# Patient Record
Sex: FEMALE | URBAN - METROPOLITAN AREA
[De-identification: names, ages, dates, MRNs, and addresses within clinical notes are randomized per-mention and may not be internally consistent; named-entity substitution may affect disease eponyms.]

---

## 2017-01-05 NOTE — TELEPHONE ENCOUNTER
Requested Prescriptions     Pending Prescriptions Disp Refills    ramipril (ALTACE) 10 mg capsule 90 Cap 1     Sig: Take 1 Cap by mouth daily.  naproxen (NAPROSYN) 500 mg tablet 180 Tab 1     Sig: Take 1 Tab by mouth two (2) times daily (with meals).  empagliflozin (JARDIANCE) 10 mg tablet 90 Tab 1     Sig: Take 1 Tab by mouth daily.

## 2017-01-08 RX ORDER — NAPROXEN 500 MG/1
500 TABLET ORAL 2 TIMES DAILY WITH MEALS
Qty: 180 TAB | Refills: 1 | OUTPATIENT
Start: 2017-01-08

## 2017-01-08 RX ORDER — RAMIPRIL 10 MG/1
10 CAPSULE ORAL DAILY
Qty: 90 CAP | Refills: 0 | Status: SHIPPED | OUTPATIENT
Start: 2017-01-08 | End: 2017-03-20 | Stop reason: SDUPTHER

## 2017-01-08 NOTE — TELEPHONE ENCOUNTER
Patient should not be using Naprosyn this is contributing to her elevated blood pressure and also can result in kidney damage

## 2017-05-30 ENCOUNTER — HOSPITAL ENCOUNTER (OUTPATIENT)
Dept: LAB | Age: 42
Discharge: HOME | End: 2017-05-30
Payer: OTHER GOVERNMENT

## 2017-05-30 ENCOUNTER — OFFICE VISIT (OUTPATIENT)
Dept: FAMILY MEDICINE CLINIC | Age: 42
End: 2017-05-30

## 2017-05-30 VITALS
DIASTOLIC BLOOD PRESSURE: 66 MMHG | HEART RATE: 73 BPM | TEMPERATURE: 98.2 F | WEIGHT: 290 LBS | BODY MASS INDEX: 45.52 KG/M2 | RESPIRATION RATE: 16 BRPM | SYSTOLIC BLOOD PRESSURE: 130 MMHG | HEIGHT: 67 IN | OXYGEN SATURATION: 99 %

## 2017-05-30 DIAGNOSIS — R73.9 HYPERGLYCEMIA: ICD-10-CM

## 2017-05-30 DIAGNOSIS — E55.9 VITAMIN D DEFICIENCY: ICD-10-CM

## 2017-05-30 DIAGNOSIS — I10 ESSENTIAL HYPERTENSION: Primary | ICD-10-CM

## 2017-05-30 DIAGNOSIS — E78.5 HYPERLIPIDEMIA, UNSPECIFIED HYPERLIPIDEMIA TYPE: ICD-10-CM

## 2017-05-30 DIAGNOSIS — I10 ESSENTIAL HYPERTENSION: ICD-10-CM

## 2017-05-30 LAB
ALBUMIN SERPL BCP-MCNC: 3.7 G/DL (ref 3.4–5)
ALBUMIN/GLOB SERPL: 1 {RATIO} (ref 0.8–1.7)
ALP SERPL-CCNC: 64 U/L (ref 45–117)
ALT SERPL-CCNC: 30 U/L (ref 13–56)
ANION GAP BLD CALC-SCNC: 11 MMOL/L (ref 3–18)
APPEARANCE UR: CLEAR
AST SERPL W P-5'-P-CCNC: 19 U/L (ref 15–37)
BASOPHILS # BLD AUTO: 0 K/UL (ref 0–0.06)
BASOPHILS # BLD: 0 % (ref 0–2)
BILIRUB SERPL-MCNC: 0.3 MG/DL (ref 0.2–1)
BILIRUB UR QL: NEGATIVE
BUN SERPL-MCNC: 10 MG/DL (ref 7–18)
BUN/CREAT SERPL: 13 (ref 12–20)
CALCIUM SERPL-MCNC: 8.5 MG/DL (ref 8.5–10.1)
CHLORIDE SERPL-SCNC: 101 MMOL/L (ref 100–108)
CHOLEST SERPL-MCNC: 201 MG/DL
CO2 SERPL-SCNC: 27 MMOL/L (ref 21–32)
COLOR UR: YELLOW
CREAT SERPL-MCNC: 0.75 MG/DL (ref 0.6–1.3)
DIFFERENTIAL METHOD BLD: ABNORMAL
EOSINOPHIL # BLD: 0.1 K/UL (ref 0–0.4)
EOSINOPHIL NFR BLD: 2 % (ref 0–5)
ERYTHROCYTE [DISTWIDTH] IN BLOOD BY AUTOMATED COUNT: 21.1 % (ref 11.6–14.5)
EST. AVERAGE GLUCOSE BLD GHB EST-MCNC: 128 MG/DL
GLOBULIN SER CALC-MCNC: 3.8 G/DL (ref 2–4)
GLUCOSE SERPL-MCNC: 106 MG/DL (ref 74–99)
GLUCOSE UR STRIP.AUTO-MCNC: >1000 MG/DL
HBA1C MFR BLD: 6.1 % (ref 4.2–5.6)
HCT VFR BLD AUTO: 33.5 % (ref 35–45)
HDLC SERPL-MCNC: 41 MG/DL (ref 40–60)
HDLC SERPL: 4.9 {RATIO} (ref 0–5)
HGB BLD-MCNC: 10 G/DL (ref 12–16)
HGB UR QL STRIP: NEGATIVE
KETONES UR QL STRIP.AUTO: NEGATIVE MG/DL
LDLC SERPL CALC-MCNC: 138.2 MG/DL (ref 0–100)
LEUKOCYTE ESTERASE UR QL STRIP.AUTO: NEGATIVE
LIPID PROFILE,FLP: ABNORMAL
LYMPHOCYTES # BLD AUTO: 36 % (ref 21–52)
LYMPHOCYTES # BLD: 2.3 K/UL (ref 0.9–3.6)
MCH RBC QN AUTO: 21.4 PG (ref 24–34)
MCHC RBC AUTO-ENTMCNC: 29.9 G/DL (ref 31–37)
MCV RBC AUTO: 71.7 FL (ref 74–97)
MONOCYTES # BLD: 0.3 K/UL (ref 0.05–1.2)
MONOCYTES NFR BLD AUTO: 5 % (ref 3–10)
NEUTS SEG # BLD: 3.7 K/UL (ref 1.8–8)
NEUTS SEG NFR BLD AUTO: 57 % (ref 40–73)
NITRITE UR QL STRIP.AUTO: NEGATIVE
PH UR STRIP: 6.5 [PH] (ref 5–8)
PLATELET # BLD AUTO: 318 K/UL (ref 135–420)
PMV BLD AUTO: 10 FL (ref 9.2–11.8)
POTASSIUM SERPL-SCNC: 3.6 MMOL/L (ref 3.5–5.5)
PROT SERPL-MCNC: 7.5 G/DL (ref 6.4–8.2)
PROT UR STRIP-MCNC: NEGATIVE MG/DL
RBC # BLD AUTO: 4.67 M/UL (ref 4.2–5.3)
SODIUM SERPL-SCNC: 139 MMOL/L (ref 136–145)
SP GR UR REFRACTOMETRY: >1.03 (ref 1–1.03)
TRIGL SERPL-MCNC: 109 MG/DL (ref ?–150)
UROBILINOGEN UR QL STRIP.AUTO: 0.2 EU/DL (ref 0.2–1)
VLDLC SERPL CALC-MCNC: 21.8 MG/DL
WBC # BLD AUTO: 6.4 K/UL (ref 4.6–13.2)

## 2017-05-30 PROCEDURE — 85025 COMPLETE CBC W/AUTO DIFF WBC: CPT | Performed by: INTERNAL MEDICINE

## 2017-05-30 PROCEDURE — 82306 VITAMIN D 25 HYDROXY: CPT | Performed by: INTERNAL MEDICINE

## 2017-05-30 PROCEDURE — 81003 URINALYSIS AUTO W/O SCOPE: CPT | Performed by: INTERNAL MEDICINE

## 2017-05-30 PROCEDURE — 80061 LIPID PANEL: CPT | Performed by: INTERNAL MEDICINE

## 2017-05-30 PROCEDURE — 83036 HEMOGLOBIN GLYCOSYLATED A1C: CPT | Performed by: INTERNAL MEDICINE

## 2017-05-30 PROCEDURE — 36415 COLL VENOUS BLD VENIPUNCTURE: CPT | Performed by: INTERNAL MEDICINE

## 2017-05-30 PROCEDURE — 82043 UR ALBUMIN QUANTITATIVE: CPT | Performed by: INTERNAL MEDICINE

## 2017-05-30 PROCEDURE — 80053 COMPREHEN METABOLIC PANEL: CPT | Performed by: INTERNAL MEDICINE

## 2017-05-30 RX ORDER — DEXTROAMPHETAMINE SACCHARATE, AMPHETAMINE ASPARTATE, DEXTROAMPHETAMINE SULFATE AND AMPHETAMINE SULFATE 2.5; 2.5; 2.5; 2.5 MG/1; MG/1; MG/1; MG/1
10 TABLET ORAL
COMMUNITY

## 2017-05-30 RX ORDER — ACETAMINOPHEN 500 MG
TABLET ORAL
Qty: 1 KIT | Refills: 0 | Status: SHIPPED | OUTPATIENT
Start: 2017-05-30

## 2017-05-30 RX ORDER — AMPICILLIN TRIHYDRATE 250 MG
600 CAPSULE ORAL
COMMUNITY

## 2017-05-30 RX ORDER — METFORMIN HYDROCHLORIDE 500 MG/1
500 TABLET ORAL 2 TIMES DAILY WITH MEALS
Qty: 180 TAB | Refills: 1 | Status: SHIPPED | OUTPATIENT
Start: 2017-05-30

## 2017-05-30 NOTE — PROGRESS NOTES
Vanessa Garcia is a 35 y.o. female presents to office for  Follow up on HTN, Cholesterol and elevated blood sugar       1. Have you been to the ER, urgent care clinic or hospitalized since your last visit?  No           Health Maintenance items with a due date reviewed with patient:  Health Maintenance Due   Topic Date Due    DTaP/Tdap/Td series (1 - Tdap) 10/22/1994    PAP AKA CERVICAL CYTOLOGY  10/22/1994

## 2017-05-30 NOTE — PROGRESS NOTES
HISTORY OF PRESENT ILLNESS  Marya David is a 35 y.o. female here for follow-up of hyperlipidemia hypertension and elevated blood glucose. Patient admits to not checking her blood sugars. With the exception of an upper respiratory tract infection last week she states that she is feeling well and has no complaints. She states that she is sexually active using condoms occasionally. She was advised that both Altace and red yeast rice can result in severe birth defects and she should use some definitive type of birth control while taking these medications. She was also advised that if she wanted to become pregnant she should stop these medications at least 6 months in advance. Cholesterol Problem   The history is provided by the patient and medical records. This is a chronic problem. The problem has not changed since onset. Pertinent negatives include no chest pain, no abdominal pain, no headaches and no shortness of breath. The symptoms are aggravated by eating. The symptoms are relieved by medications. Treatments tried: Red yeast rice. The treatment provided mild relief. High Blood Sugar   The history is provided by the patient and medical records. This is a chronic problem. The problem has been gradually improving. Pertinent negatives include no chest pain, no abdominal pain, no headaches and no shortness of breath. The symptoms are aggravated by eating. The symptoms are relieved by medications. Treatments tried: Metformin. The treatment provided significant relief. Hypertension    The history is provided by the patient and medical records. The problem has been gradually improving. Pertinent negatives include no chest pain, no orthopnea, no palpitations, no blurred vision, no headaches, no peripheral edema, no dizziness and no shortness of breath. There are no associated agents to hypertension. Risk factors include dyslipidemia, diabetes mellitus and obesity.    Other   The history is provided by the patient and medical records (Patient has history of vitamin D deficiency. ). Pertinent negatives include no chest pain, no abdominal pain, no headaches and no shortness of breath. No Known Allergies  Current Outpatient Prescriptions on File Prior to Visit   Medication Sig Dispense Refill    ramipril (ALTACE) 10 mg capsule TAKE 1 CAPSULE DAILY 30 Cap 1    empagliflozin (JARDIANCE) 10 mg tablet Take 1 Tab by mouth daily. 90 Tab 0    cholecalciferol, vitamin D3, (VITAMIN D3) 2,000 unit tab Take 4,000 Units by mouth. No current facility-administered medications on file prior to visit. Past Medical History:   Diagnosis Date    ADHD (attention deficit hyperactivity disorder)     Hypertension     monitored for pre-htn    Thyroid disease     pt was breifly placed on synthroid for low TSH     Past Surgical History:   Procedure Laterality Date    BREAST SURGERY PROCEDURE UNLISTED      breast reduction    HX CHOLECYSTECTOMY       Family History   Problem Relation Age of Onset    Diabetes Mother     Hypertension Mother     Diabetes Father     Heart Disease Father     Diabetes Brother     Diabetes Paternal Grandmother      Social History     Social History    Marital status:      Spouse name: N/A    Number of children: N/A    Years of education: N/A     Occupational History    Not on file. Social History Main Topics    Smoking status: Never Smoker    Smokeless tobacco: Never Used    Alcohol use Yes      Comment: socially    Drug use: No    Sexual activity: Yes     Partners: Male     Birth control/ protection: Condom     Other Topics Concern    Not on file     Social History Narrative       Review of Systems   Constitutional: Negative. Eyes: Negative. Negative for blurred vision. Respiratory: Negative for shortness of breath. Cardiovascular: Negative. Negative for chest pain, palpitations and orthopnea. Gastrointestinal: Negative for abdominal pain.    Musculoskeletal: Negative. Neurological: Negative. Negative for dizziness and headaches. Endo/Heme/Allergies: Negative. Psychiatric/Behavioral: Negative. Visit Vitals    /66 (BP 1 Location: Left arm, BP Patient Position: Sitting)    Pulse 73    Temp 98.2 °F (36.8 °C) (Oral)    Resp 16    Ht 5' 7\" (1.702 m)    Wt 290 lb (131.5 kg)    SpO2 99%    BMI 45.42 kg/m2       Physical Exam   Constitutional: She is oriented to person, place, and time. She appears well-developed and well-nourished. HENT:   Head: Normocephalic and atraumatic. Cardiovascular: Normal rate, regular rhythm, normal heart sounds and intact distal pulses. Exam reveals no gallop and no friction rub. No murmur heard. Pulmonary/Chest: Breath sounds normal. No respiratory distress. She has no wheezes. She has no rales. Musculoskeletal: Normal range of motion. She exhibits no edema, tenderness or deformity. Neurological: She is alert and oriented to person, place, and time. No cranial nerve deficit. Coordination normal.   Skin: Skin is warm and dry. No rash noted. No erythema. Psychiatric: She has a normal mood and affect. Her behavior is normal. Judgment and thought content normal.   Vitals reviewed. ASSESSMENT and PLAN    ICD-10-CM ICD-9-CM    1. Essential hypertension Q12 516.2 METABOLIC PANEL, COMPREHENSIVE      URINALYSIS W/ RFLX MICROSCOPIC   2. Hyperglycemia R73.9 790.29 HEMOGLOBIN A1C WITH EAG      CBC WITH AUTOMATED DIFF      METABOLIC PANEL, COMPREHENSIVE      MICROALBUMIN, UR, RAND W/ MICROALBUMIN/CREA RATIO      URINALYSIS W/ RFLX MICROSCOPIC   3. Vitamin D deficiency E55.9 268.9 VITAMIN D, 25 HYDROXY   4. Hyperlipidemia, unspecified hyperlipidemia type W59.7 909.4 METABOLIC PANEL, COMPREHENSIVE      LIPID PANEL     Follow-up Disposition:  Return in about 3 months (around 8/30/2017).   the following changes in treatment are made: Patient will restart metformin 500 mg once a day for 1 week then 1 tablet twice daily with food.

## 2017-05-31 LAB
25(OH)D3 SERPL-MCNC: 21.4 NG/ML (ref 30–100)
CREAT UR-MCNC: 109.22 MG/DL (ref 30–125)
MICROALBUMIN UR-MCNC: 1.2 MG/DL (ref 0–3)
MICROALBUMIN/CREAT UR-RTO: 11 MG/G (ref 0–30)

## 2017-06-12 NOTE — TELEPHONE ENCOUNTER
Pt calling to request medication refill of:    Requested Prescriptions     Pending Prescriptions Disp Refills    empagliflozin (JARDIANCE) 10 mg tablet 90 Tab 1     Sig: Take 1 Tab by mouth daily. be sent to [facility]. Pt has about 0 tabs remaining. Pts last appt was 05/30, next appt sched for 08/16. Advised pt of 72 hour time frame for refill requests. Please advise.

## 2017-06-13 NOTE — TELEPHONE ENCOUNTER
Dr. Crystal Gardner, please see refill request for patient, thank you! Requested Prescriptions     Pending Prescriptions Disp Refills    empagliflozin (JARDIANCE) 10 mg tablet 90 Tab 1     Sig: Take 1 Tab by mouth daily.

## 2017-08-03 NOTE — TELEPHONE ENCOUNTER
Pt calling to request medication refill of:  Requested Prescriptions     Pending Prescriptions Disp Refills    ramipril (ALTACE) 10 mg capsule 30 Cap 1          be sent to [id number] has about 7 tabs remaining. Pts last appt was 5/30/17   Advised pt of 72 hour time frame for refill requests. Please advise.

## 2017-08-07 RX ORDER — RAMIPRIL 10 MG/1
CAPSULE ORAL
Qty: 90 CAP | Refills: 1 | Status: SHIPPED | OUTPATIENT
Start: 2017-08-07